# Patient Record
Sex: FEMALE | URBAN - METROPOLITAN AREA
[De-identification: names, ages, dates, MRNs, and addresses within clinical notes are randomized per-mention and may not be internally consistent; named-entity substitution may affect disease eponyms.]

---

## 2019-02-28 ENCOUNTER — APPOINTMENT (RX ONLY)
Dept: URBAN - METROPOLITAN AREA CLINIC 96 | Facility: CLINIC | Age: 54
Setting detail: DERMATOLOGY
End: 2019-02-28

## 2019-02-28 DIAGNOSIS — L92.0 GRANULOMA ANNULARE: ICD-10-CM

## 2019-02-28 PROBLEM — J45.909 UNSPECIFIED ASTHMA, UNCOMPLICATED: Status: ACTIVE | Noted: 2019-02-28

## 2019-02-28 PROBLEM — F32.9 MAJOR DEPRESSIVE DISORDER, SINGLE EPISODE, UNSPECIFIED: Status: ACTIVE | Noted: 2019-02-28

## 2019-02-28 PROBLEM — F41.9 ANXIETY DISORDER, UNSPECIFIED: Status: ACTIVE | Noted: 2019-02-28

## 2019-02-28 PROCEDURE — ? COUNSELING

## 2019-02-28 PROCEDURE — ? PRESCRIPTION

## 2019-02-28 PROCEDURE — 99201: CPT

## 2019-02-28 PROCEDURE — ? ADDITIONAL NOTES

## 2019-02-28 PROCEDURE — ? ORDER TESTS

## 2019-02-28 RX ORDER — PIMECROLIMUS 10 MG/G
CREAM TOPICAL QD
Qty: 1 | Refills: 2 | Status: ERX | COMMUNITY
Start: 2019-02-28

## 2019-02-28 RX ORDER — TRIAMCINOLONE ACETONIDE 1 MG/G
CREAM TOPICAL BID
Qty: 1 | Refills: 3 | Status: ERX | COMMUNITY
Start: 2019-02-28

## 2019-02-28 RX ADMIN — PIMECROLIMUS: 10 CREAM TOPICAL at 00:00

## 2019-02-28 RX ADMIN — TRIAMCINOLONE ACETONIDE: 1 CREAM TOPICAL at 00:00

## 2019-02-28 ASSESSMENT — LOCATION ZONE DERM
LOCATION ZONE: ARM
LOCATION ZONE: HAND

## 2019-02-28 ASSESSMENT — LOCATION DETAILED DESCRIPTION DERM
LOCATION DETAILED: RIGHT DISTAL DORSAL FOREARM
LOCATION DETAILED: LEFT ELBOW
LOCATION DETAILED: LEFT RADIAL DORSAL HAND
LOCATION DETAILED: RIGHT RADIAL DORSAL HAND
LOCATION DETAILED: LEFT DISTAL DORSAL FOREARM
LOCATION DETAILED: LEFT PROXIMAL DORSAL FOREARM

## 2019-02-28 ASSESSMENT — LOCATION SIMPLE DESCRIPTION DERM
LOCATION SIMPLE: LEFT HAND
LOCATION SIMPLE: RIGHT FOREARM
LOCATION SIMPLE: LEFT ELBOW
LOCATION SIMPLE: LEFT FOREARM
LOCATION SIMPLE: RIGHT HAND

## 2019-02-28 NOTE — PROCEDURE: ADDITIONAL NOTES
Additional Notes: Due to the extent of disease and new appearing lesions we will treat systemically. Will wait for blood work results to call in Dapsone 100mg #30 take Po QD
Detail Level: Simple

## 2019-02-28 NOTE — PROCEDURE: ORDER TESTS
Billing Type: Third-Party Bill
Expected Date Of Service: 02/28/2019
Bill For Surgical Tray: no
Performing Laboratory: [id number]

## 2019-03-06 ENCOUNTER — RX ONLY (OUTPATIENT)
Age: 54
Setting detail: RX ONLY
End: 2019-03-06

## 2019-03-06 RX ORDER — DAPSONE 100 MG/1
1 TABLET ORAL QD
Qty: 30 | Refills: 0 | Status: ERX | COMMUNITY
Start: 2019-03-06

## 2019-05-03 ENCOUNTER — APPOINTMENT (RX ONLY)
Dept: URBAN - METROPOLITAN AREA CLINIC 96 | Facility: CLINIC | Age: 54
Setting detail: DERMATOLOGY
End: 2019-05-03

## 2019-05-03 DIAGNOSIS — L92.0 GRANULOMA ANNULARE: ICD-10-CM | Status: IMPROVED

## 2019-05-03 PROCEDURE — ? PRESCRIPTION

## 2019-05-03 PROCEDURE — ? TREATMENT REGIMEN

## 2019-05-03 PROCEDURE — ? COUNSELING

## 2019-05-03 PROCEDURE — 99212 OFFICE O/P EST SF 10 MIN: CPT

## 2019-05-03 RX ORDER — PIMECROLIMUS 10 MG/G
CREAM TOPICAL QD
Qty: 1 | Refills: 2 | Status: ERX

## 2019-05-03 RX ORDER — DAPSONE 25 MG/1
TABLET ORAL QD
Qty: 60 | Refills: 0 | Status: ERX | COMMUNITY
Start: 2019-05-03

## 2019-05-03 RX ORDER — DAPSONE 100 MG/1
TABLET ORAL QD
Qty: 30 | Refills: 0 | Status: ERX | COMMUNITY
Start: 2019-05-03

## 2019-05-03 RX ORDER — TRIAMCINOLONE ACETONIDE 1 MG/G
CREAM TOPICAL BID
Qty: 1 | Refills: 3 | Status: ERX

## 2019-05-03 RX ADMIN — DAPSONE: 25 TABLET ORAL at 00:00

## 2019-05-03 RX ADMIN — DAPSONE: 100 TABLET ORAL at 00:00

## 2019-05-03 ASSESSMENT — LOCATION DETAILED DESCRIPTION DERM
LOCATION DETAILED: LEFT PROXIMAL DORSAL FOREARM
LOCATION DETAILED: RIGHT RADIAL DORSAL HAND
LOCATION DETAILED: LEFT DISTAL DORSAL FOREARM
LOCATION DETAILED: RIGHT DISTAL DORSAL FOREARM
LOCATION DETAILED: RIGHT PROXIMAL DORSAL FOREARM

## 2019-05-03 ASSESSMENT — LOCATION ZONE DERM
LOCATION ZONE: HAND
LOCATION ZONE: ARM

## 2019-05-03 ASSESSMENT — LOCATION SIMPLE DESCRIPTION DERM
LOCATION SIMPLE: RIGHT HAND
LOCATION SIMPLE: LEFT FOREARM
LOCATION SIMPLE: RIGHT FOREARM

## 2019-05-03 NOTE — PROCEDURE: TREATMENT REGIMEN
Continue Regimen: Triamcinolone cream daily \\nElidel Cream QHS
Detail Level: Zone
Initiate Treatment: Increase Dapsone 150mg QD\\nDiscussed increasing to 200mg at next OV

## 2019-06-12 ENCOUNTER — APPOINTMENT (RX ONLY)
Dept: URBAN - METROPOLITAN AREA CLINIC 96 | Facility: CLINIC | Age: 54
Setting detail: DERMATOLOGY
End: 2019-06-12

## 2019-06-12 DIAGNOSIS — L92.0 GRANULOMA ANNULARE: ICD-10-CM | Status: RESOLVING

## 2019-06-12 PROCEDURE — ? ORDER TESTS

## 2019-06-12 PROCEDURE — ? COUNSELING

## 2019-06-12 PROCEDURE — ? PRESCRIPTION

## 2019-06-12 PROCEDURE — ? TREATMENT REGIMEN

## 2019-06-12 PROCEDURE — 99212 OFFICE O/P EST SF 10 MIN: CPT

## 2019-06-12 RX ORDER — PIMECROLIMUS 10 MG/G
CREAM TOPICAL QD
Qty: 1 | Refills: 2 | Status: ERX

## 2019-06-12 RX ORDER — DAPSONE 100 MG/1
TABLET ORAL QD
Qty: 60 | Refills: 2 | Status: ERX

## 2019-06-12 ASSESSMENT — LOCATION DETAILED DESCRIPTION DERM
LOCATION DETAILED: RIGHT DISTAL DORSAL FOREARM
LOCATION DETAILED: RIGHT PROXIMAL DORSAL FOREARM
LOCATION DETAILED: LEFT DISTAL DORSAL FOREARM
LOCATION DETAILED: RIGHT RADIAL DORSAL HAND
LOCATION DETAILED: LEFT PROXIMAL DORSAL FOREARM

## 2019-06-12 ASSESSMENT — LOCATION SIMPLE DESCRIPTION DERM
LOCATION SIMPLE: LEFT FOREARM
LOCATION SIMPLE: RIGHT FOREARM
LOCATION SIMPLE: RIGHT HAND

## 2019-06-12 ASSESSMENT — SEVERITY ASSESSMENT: SEVERITY: ALMOST CLEAR

## 2019-06-12 ASSESSMENT — LOCATION ZONE DERM
LOCATION ZONE: ARM
LOCATION ZONE: HAND

## 2019-06-12 NOTE — PROCEDURE: TREATMENT REGIMEN
Plan: Patient will need labs next office visit.\\nRequisition form given
Initiate Treatment: Increase Dapsone 100mg  BID
Detail Level: Zone
Continue Regimen: Triamcinolone cream daily \\nElidel Cream QHS (refills given)

## 2022-02-20 NOTE — PROCEDURE: ORDER TESTS
Lab Facility: [id number]
Expected Date Of Service: 06/12/2019
Bill For Surgical Tray: no
Performing Laboratory: [id number]
Billing Type: Third-Party Bill
[Negative] : Heme/Lymph